# Patient Record
Sex: FEMALE | Race: WHITE | NOT HISPANIC OR LATINO | URBAN - NONMETROPOLITAN AREA
[De-identification: names, ages, dates, MRNs, and addresses within clinical notes are randomized per-mention and may not be internally consistent; named-entity substitution may affect disease eponyms.]

---

## 2017-11-29 ENCOUNTER — SKIN CHECK (OUTPATIENT)
Dept: URBAN - NONMETROPOLITAN AREA CLINIC 6 | Facility: CLINIC | Age: 67
Setting detail: DERMATOLOGY
End: 2017-11-29

## 2017-11-29 DIAGNOSIS — I87.2 VENOUS INSUFFICIENCY (CHRONIC) (PERIPHERAL): ICD-10-CM

## 2017-11-29 DIAGNOSIS — I83.10 VARICOSE VEINS OF UNSPECIFIED LOWER EXTREMITY WITH INFLAMMATION: ICD-10-CM

## 2017-11-29 PROCEDURE — 99214 OFFICE O/P EST MOD 30 MIN: CPT

## 2018-05-02 RX ORDER — NYSTATIN AND TRIAMCINOLONE ACETONIDE 100000; 1 [USP'U]/G; MG/G
1 APPLICATION CREAM TOPICAL BID
Qty: 30 | Refills: 0 | Status: DISCONTINUED
Start: 2018-05-02 | End: 2019-09-23

## 2019-01-14 ENCOUNTER — SKIN CANCER FOLLOW-UP (OUTPATIENT)
Dept: URBAN - NONMETROPOLITAN AREA CLINIC 6 | Facility: CLINIC | Age: 69
Setting detail: DERMATOLOGY
End: 2019-01-14

## 2019-01-14 DIAGNOSIS — L81.0 POSTINFLAMMATORY HYPERPIGMENTATION: ICD-10-CM

## 2019-01-14 DIAGNOSIS — L57.0 ACTINIC KERATOSIS: ICD-10-CM

## 2019-01-14 DIAGNOSIS — L94.0 LOCALIZED SCLERODERMA [MORPHEA]: ICD-10-CM

## 2019-01-14 PROCEDURE — 99214 OFFICE O/P EST MOD 30 MIN: CPT

## 2020-01-14 ENCOUNTER — SKIN CANCER FOLLOW-UP (OUTPATIENT)
Dept: URBAN - NONMETROPOLITAN AREA CLINIC 6 | Facility: CLINIC | Age: 70
Setting detail: DERMATOLOGY
End: 2020-01-14

## 2020-01-14 DIAGNOSIS — L23.9 ALLERGIC CONTACT DERMATITIS, UNSPECIFIED CAUSE: ICD-10-CM

## 2020-01-14 DIAGNOSIS — D22.9 MELANOCYTIC NEVI, UNSPECIFIED: ICD-10-CM

## 2020-01-14 DIAGNOSIS — L81.4 OTHER MELANIN HYPERPIGMENTATION: ICD-10-CM

## 2020-01-14 DIAGNOSIS — L71.8 OTHER ROSACEA: ICD-10-CM

## 2020-01-14 PROCEDURE — 99214 OFFICE O/P EST MOD 30 MIN: CPT

## 2020-01-14 PROCEDURE — 17003 DESTRUCT PREMALG LES 2-14: CPT

## 2020-01-14 PROCEDURE — 17000 DESTRUCT PREMALG LESION: CPT

## 2021-01-11 ENCOUNTER — SKIN CANCER FOLLOW-UP (OUTPATIENT)
Dept: URBAN - NONMETROPOLITAN AREA CLINIC 6 | Facility: CLINIC | Age: 71
Setting detail: DERMATOLOGY
End: 2021-01-11

## 2021-01-11 DIAGNOSIS — C44.311 BASAL CELL CARCINOMA OF SKIN OF NOSE: ICD-10-CM

## 2021-01-11 PROCEDURE — 99213 OFFICE O/P EST LOW 20 MIN: CPT

## 2022-03-10 ENCOUNTER — SKIN CANCER FOLLOW-UP (OUTPATIENT)
Dept: URBAN - NONMETROPOLITAN AREA CLINIC 6 | Facility: CLINIC | Age: 72
Setting detail: DERMATOLOGY
End: 2022-03-10

## 2022-03-10 DIAGNOSIS — L72.0 EPIDERMAL CYST: ICD-10-CM

## 2022-03-10 DIAGNOSIS — R20.8 OTHER DISTURBANCES OF SKIN SENSATION: ICD-10-CM

## 2022-03-10 PROCEDURE — 99213 OFFICE O/P EST LOW 20 MIN: CPT

## 2022-03-10 PROCEDURE — 11102 TANGNTL BX SKIN SINGLE LES: CPT

## 2022-04-28 ENCOUNTER — CRYOTHERAPY (OUTPATIENT)
Dept: URBAN - NONMETROPOLITAN AREA CLINIC 6 | Facility: CLINIC | Age: 72
Setting detail: DERMATOLOGY
End: 2022-04-28

## 2022-04-28 DIAGNOSIS — D48.5 NEOPLASM OF UNCERTAIN BEHAVIOR OF SKIN: ICD-10-CM

## 2022-04-28 PROCEDURE — 17110 DESTRUCT B9 LESION 1-14: CPT

## 2022-06-15 ENCOUNTER — PDT (OUTPATIENT)
Dept: URBAN - NONMETROPOLITAN AREA CLINIC 6 | Facility: CLINIC | Age: 72
Setting detail: DERMATOLOGY
End: 2022-06-15

## 2022-06-15 DIAGNOSIS — L57.0 ACTINIC KERATOSIS: ICD-10-CM

## 2022-06-15 PROCEDURE — 96574 DBRDMT PRMLG LES W/PDT: CPT

## 2022-07-20 ENCOUNTER — FOLLOW-UP (OUTPATIENT)
Dept: URBAN - NONMETROPOLITAN AREA CLINIC 6 | Facility: CLINIC | Age: 72
Setting detail: DERMATOLOGY
End: 2022-07-20

## 2022-07-20 DIAGNOSIS — C44.219 BASAL CELL CARCINOMA OF SKIN OF LEFT EAR AND EXTERNAL AURICULAR CANAL: ICD-10-CM

## 2022-07-20 PROCEDURE — 17003 DESTRUCT PREMALG LES 2-14: CPT

## 2022-07-20 PROCEDURE — 17000 DESTRUCT PREMALG LESION: CPT

## 2023-03-13 ENCOUNTER — APPOINTMENT (OUTPATIENT)
Dept: URBAN - NONMETROPOLITAN AREA SURGERY 6 | Age: 73
Setting detail: DERMATOLOGY
End: 2023-03-14

## 2023-03-13 DIAGNOSIS — L57.8 OTHER SKIN CHANGES DUE TO CHRONIC EXPOSURE TO NONIONIZING RADIATION: ICD-10-CM

## 2023-03-13 DIAGNOSIS — D22 MELANOCYTIC NEVI: ICD-10-CM

## 2023-03-13 DIAGNOSIS — Z85.828 PERSONAL HISTORY OF OTHER MALIGNANT NEOPLASM OF SKIN: ICD-10-CM

## 2023-03-13 DIAGNOSIS — L81.4 OTHER MELANIN HYPERPIGMENTATION: ICD-10-CM

## 2023-03-13 DIAGNOSIS — L82.1 OTHER SEBORRHEIC KERATOSIS: ICD-10-CM

## 2023-03-13 PROBLEM — D22.5 MELANOCYTIC NEVI OF TRUNK: Status: ACTIVE | Noted: 2023-03-13

## 2023-03-13 PROCEDURE — 99213 OFFICE O/P EST LOW 20 MIN: CPT

## 2023-03-13 PROCEDURE — OTHER SUNSCREEN RECOMMENDATIONS: OTHER

## 2023-03-13 PROCEDURE — OTHER MIPS QUALITY: OTHER

## 2023-03-13 PROCEDURE — OTHER COUNSELING: OTHER

## 2023-03-13 PROCEDURE — OTHER REASSURANCE: OTHER

## 2023-03-13 ASSESSMENT — LOCATION DETAILED DESCRIPTION DERM
LOCATION DETAILED: INFERIOR MID FOREHEAD
LOCATION DETAILED: LEFT NASAL ALA
LOCATION DETAILED: EPIGASTRIC SKIN
LOCATION DETAILED: LEFT INFERIOR UPPER BACK

## 2023-03-13 ASSESSMENT — LOCATION SIMPLE DESCRIPTION DERM
LOCATION SIMPLE: LEFT UPPER BACK
LOCATION SIMPLE: INFERIOR FOREHEAD
LOCATION SIMPLE: LEFT NOSE
LOCATION SIMPLE: ABDOMEN

## 2023-03-13 ASSESSMENT — LOCATION ZONE DERM
LOCATION ZONE: TRUNK
LOCATION ZONE: NOSE
LOCATION ZONE: FACE

## 2023-03-13 NOTE — PROCEDURE: REASSURANCE
Detail Level: Generalized
Hide Additional Notes?: No
Additional Note: Benign nature stressed.  Likelihood of additional lesions developing discussed.

## 2023-03-13 NOTE — PROCEDURE: COUNSELING
Detail Level: Detailed
Detail Level: Generalized
Patient Specific Counseling (Will Not Stick From Patient To Patient): She did well with PDT treatment June 2022 and annual treatment suggested.  Due to grandchildren's sports activities, she would like to postpone until late fall.  She was scheduled today for November 2023.
Detail Level: Zone

## 2023-03-13 NOTE — HPI: NON-MELANOMA SKIN CANCER F/U (HISTORY OF NMSC)
What Is The Reason For Today's Visit?: History of Non-Melanoma Skin Cancer
How Many Skin Cancers Have You Had?: one
Additional History: Patient has no new concerns.

## 2024-03-12 ENCOUNTER — APPOINTMENT (OUTPATIENT)
Dept: URBAN - NONMETROPOLITAN AREA SURGERY 6 | Age: 74
Setting detail: DERMATOLOGY
End: 2024-03-15

## 2024-03-12 DIAGNOSIS — L81.4 OTHER MELANIN HYPERPIGMENTATION: ICD-10-CM

## 2024-03-12 DIAGNOSIS — L82.1 OTHER SEBORRHEIC KERATOSIS: ICD-10-CM

## 2024-03-12 DIAGNOSIS — L57.0 ACTINIC KERATOSIS: ICD-10-CM

## 2024-03-12 DIAGNOSIS — L24 IRRITANT CONTACT DERMATITIS: ICD-10-CM

## 2024-03-12 DIAGNOSIS — Z85.828 PERSONAL HISTORY OF OTHER MALIGNANT NEOPLASM OF SKIN: ICD-10-CM

## 2024-03-12 DIAGNOSIS — D22 MELANOCYTIC NEVI: ICD-10-CM

## 2024-03-12 PROBLEM — L24.9 IRRITANT CONTACT DERMATITIS, UNSPECIFIED CAUSE: Status: ACTIVE | Noted: 2024-03-12

## 2024-03-12 PROBLEM — D22.5 MELANOCYTIC NEVI OF TRUNK: Status: ACTIVE | Noted: 2024-03-12

## 2024-03-12 PROCEDURE — OTHER MIPS QUALITY: OTHER

## 2024-03-12 PROCEDURE — OTHER LIQUID NITROGEN: OTHER

## 2024-03-12 PROCEDURE — 17000 DESTRUCT PREMALG LESION: CPT

## 2024-03-12 PROCEDURE — OTHER SUNSCREEN RECOMMENDATIONS: OTHER

## 2024-03-12 PROCEDURE — OTHER COUNSELING: OTHER

## 2024-03-12 PROCEDURE — 99213 OFFICE O/P EST LOW 20 MIN: CPT | Mod: 25

## 2024-03-12 PROCEDURE — OTHER REASSURANCE: OTHER

## 2024-03-12 ASSESSMENT — LOCATION ZONE DERM
LOCATION ZONE: NOSE
LOCATION ZONE: TRUNK
LOCATION ZONE: FACE
LOCATION ZONE: ARM

## 2024-03-12 ASSESSMENT — LOCATION SIMPLE DESCRIPTION DERM
LOCATION SIMPLE: LEFT CHEEK
LOCATION SIMPLE: LEFT NOSE
LOCATION SIMPLE: LEFT WRIST
LOCATION SIMPLE: ABDOMEN
LOCATION SIMPLE: LEFT UPPER BACK

## 2024-03-12 ASSESSMENT — LOCATION DETAILED DESCRIPTION DERM
LOCATION DETAILED: LEFT DORSAL WRIST
LOCATION DETAILED: LEFT NASAL ALA
LOCATION DETAILED: LEFT MEDIAL MALAR CHEEK
LOCATION DETAILED: LEFT INFERIOR UPPER BACK
LOCATION DETAILED: EPIGASTRIC SKIN

## 2024-03-12 ASSESSMENT — ITCH NUMERIC RATING SCALE: HOW SEVERE IS YOUR ITCHING?: 1

## 2024-03-12 NOTE — PROCEDURE: COUNSELING
Detail Level: Detailed
Detail Level: Generalized
Patient Specific Counseling (Will Not Stick From Patient To Patient): OTC HC 1% cream QD, prn for itching.

## 2024-03-12 NOTE — PROCEDURE: LIQUID NITROGEN
Show Aperture Variable?: No
Consent: Verbal consent obtained.  Blistering, scabbing expected.
Number Of Freeze-Thaw Cycles: 2 freeze-thaw cycles
Render Post-Care Instructions In Note?: yes
Application Tool (Optional): Cry-AC
Duration Of Freeze Thaw-Cycle (Seconds): 5
Post-Care Instructions: Clean treated areas twice daily with anti-bacterial soap and apply vaseline until healed.  Post care provided in written form.  If treated area does not resolve after 2-3 weeks, patient has been advised to call for return visit.
Detail Level: Detailed